# Patient Record
Sex: MALE | Race: WHITE | HISPANIC OR LATINO | Employment: UNEMPLOYED | URBAN - METROPOLITAN AREA
[De-identification: names, ages, dates, MRNs, and addresses within clinical notes are randomized per-mention and may not be internally consistent; named-entity substitution may affect disease eponyms.]

---

## 2020-08-14 ENCOUNTER — HOSPITAL ENCOUNTER (EMERGENCY)
Facility: HOSPITAL | Age: 35
Discharge: HOME | End: 2020-08-14
Attending: EMERGENCY MEDICINE

## 2020-08-14 VITALS
TEMPERATURE: 99 F | RESPIRATION RATE: 17 BRPM | SYSTOLIC BLOOD PRESSURE: 142 MMHG | OXYGEN SATURATION: 99 % | HEART RATE: 84 BPM | DIASTOLIC BLOOD PRESSURE: 82 MMHG

## 2020-08-14 DIAGNOSIS — M79.89 PAIN AND SWELLING OF LEFT LOWER LEG: ICD-10-CM

## 2020-08-14 DIAGNOSIS — I82.492 ACUTE DEEP VEIN THROMBOSIS (DVT) OF OTHER SPECIFIED VEIN OF LEFT LOWER EXTREMITY: Primary | ICD-10-CM

## 2020-08-14 DIAGNOSIS — M79.662 PAIN AND SWELLING OF LEFT LOWER LEG: ICD-10-CM

## 2020-08-14 LAB
ALBUMIN SERPL BCP-MCNC: 4.1 G/DL (ref 3.5–5.2)
ALP SERPL-CCNC: 96 U/L (ref 55–135)
ALT SERPL W/O P-5'-P-CCNC: 70 U/L (ref 10–44)
ANION GAP SERPL CALC-SCNC: 11 MMOL/L (ref 8–16)
APTT BLDCRRT: 23.6 SEC (ref 21–32)
AST SERPL-CCNC: 45 U/L (ref 10–40)
BASOPHILS # BLD AUTO: 0.06 K/UL (ref 0–0.2)
BASOPHILS NFR BLD: 0.7 % (ref 0–1.9)
BILIRUB SERPL-MCNC: 0.5 MG/DL (ref 0.1–1)
BUN SERPL-MCNC: 10 MG/DL (ref 6–20)
CALCIUM SERPL-MCNC: 9.7 MG/DL (ref 8.7–10.5)
CHLORIDE SERPL-SCNC: 106 MMOL/L (ref 95–110)
CO2 SERPL-SCNC: 23 MMOL/L (ref 23–29)
CREAT SERPL-MCNC: 0.8 MG/DL (ref 0.5–1.4)
DIFFERENTIAL METHOD: ABNORMAL
EOSINOPHIL # BLD AUTO: 0.1 K/UL (ref 0–0.5)
EOSINOPHIL NFR BLD: 1.5 % (ref 0–8)
ERYTHROCYTE [DISTWIDTH] IN BLOOD BY AUTOMATED COUNT: 11 % (ref 11.5–14.5)
EST. GFR  (AFRICAN AMERICAN): >60 ML/MIN/1.73 M^2
EST. GFR  (NON AFRICAN AMERICAN): >60 ML/MIN/1.73 M^2
GLUCOSE SERPL-MCNC: 103 MG/DL (ref 70–110)
HCT VFR BLD AUTO: 42.2 % (ref 40–54)
HGB BLD-MCNC: 14.3 G/DL (ref 14–18)
HIV 1+2 AB+HIV1 P24 AG SERPL QL IA: NEGATIVE
IMM GRANULOCYTES # BLD AUTO: 0.09 K/UL (ref 0–0.04)
IMM GRANULOCYTES NFR BLD AUTO: 1 % (ref 0–0.5)
INR PPP: 0.9 (ref 0.8–1.2)
LYMPHOCYTES # BLD AUTO: 2 K/UL (ref 1–4.8)
LYMPHOCYTES NFR BLD: 23.2 % (ref 18–48)
MCH RBC QN AUTO: 32.2 PG (ref 27–31)
MCHC RBC AUTO-ENTMCNC: 33.9 G/DL (ref 32–36)
MCV RBC AUTO: 95 FL (ref 82–98)
MONOCYTES # BLD AUTO: 0.9 K/UL (ref 0.3–1)
MONOCYTES NFR BLD: 10 % (ref 4–15)
NEUTROPHILS # BLD AUTO: 5.5 K/UL (ref 1.8–7.7)
NEUTROPHILS NFR BLD: 63.6 % (ref 38–73)
NRBC BLD-RTO: 0 /100 WBC
PLATELET # BLD AUTO: 324 K/UL (ref 150–350)
PMV BLD AUTO: 10.3 FL (ref 9.2–12.9)
POTASSIUM SERPL-SCNC: 4 MMOL/L (ref 3.5–5.1)
PROT SERPL-MCNC: 7.9 G/DL (ref 6–8.4)
PROTHROMBIN TIME: 10 SEC (ref 9–12.5)
RBC # BLD AUTO: 4.44 M/UL (ref 4.6–6.2)
SODIUM SERPL-SCNC: 140 MMOL/L (ref 136–145)
WBC # BLD AUTO: 8.67 K/UL (ref 3.9–12.7)

## 2020-08-14 PROCEDURE — 85730 THROMBOPLASTIN TIME PARTIAL: CPT

## 2020-08-14 PROCEDURE — 80053 COMPREHEN METABOLIC PANEL: CPT

## 2020-08-14 PROCEDURE — 36415 COLL VENOUS BLD VENIPUNCTURE: CPT

## 2020-08-14 PROCEDURE — 85610 PROTHROMBIN TIME: CPT

## 2020-08-14 PROCEDURE — 85025 COMPLETE CBC W/AUTO DIFF WBC: CPT

## 2020-08-14 PROCEDURE — 99284 EMERGENCY DEPT VISIT MOD MDM: CPT | Mod: 25

## 2020-08-14 PROCEDURE — 86703 HIV-1/HIV-2 1 RESULT ANTBDY: CPT

## 2020-08-14 RX ORDER — APIXABAN 5 MG (74)
KIT ORAL
Qty: 74 TABLET | Refills: 0 | OUTPATIENT
Start: 2020-08-14 | End: 2020-09-14

## 2020-08-14 NOTE — ED NOTES
C/o LLE swelling and pain for over 1 month. Swelling starts @ upper thigh and extends down into foot. AAOx4. Denies any other complaint. Sitting in bed appears in NAD. Pt is [place] speaking.

## 2020-08-14 NOTE — ED PROVIDER NOTES
8/14/2020, 5:40 PM   History obtained from the patient      History of Present Illness: Aman Denson is a 35 y.o. male patient who presents to the Emergency Department for L lower leg swelling which onset gradually 1 month ago.    5:42 PM: Pt was placed back in [facility]. I explained to pt that due to lack of available rooms pt was seen by myself to begin the workup. Pt understands they will be seen and dispositioned by the next available physician. Pt voices understanding and agrees with plan. Pt also understands the longer than normal wait time. I am removing myself from the care of pt and pt will now be assigned to the next available physician.     DARLEEN Chisholm FNP-C           HISTORY     Chief Complaint   Patient presents with    Leg Pain     LLE pain and swelling in calf with swellign down to ankles x 1 month. Numbness on ambulating.     Review of patient's allergies indicates:  No Known Allergies     HPI   The history is provided by the patient.   Leg Pain   There was no injury mechanism. The incident occurred several weeks ago. The pain is present in the left leg. The pain has been constant since onset. Pertinent negatives include no numbness, no inability to bear weight and no tingling.   L lower legswelling x 1 month. Pt reports being seen at urgent care for symptoms 2 weeks and sent home with pills. Pt states that he is unsure of facility or medication. Pt denies any CP, SOB, NV, abd pain or any other symptoms      PCP: Primary Doctor No     Past Medical History:  No past medical history on file.     Past Surgical History:  No past surgical history on file.     Family History:  No family history on file.     Social History:  Social History     Tobacco Use    Smoking status: Not on file   Substance and Sexual Activity    Alcohol use: Not on file    Drug use: Not on file    Sexual activity: Not on file         ROS   Review of Systems   Constitutional: Negative for fever.   HENT: Negative for sore  throat.    Respiratory: Negative for shortness of breath.    Cardiovascular: Negative for chest pain.   Gastrointestinal: Negative for nausea.   Genitourinary: Negative for dysuria.   Musculoskeletal: Negative for back pain.        + L lower leg swelling and pain   Skin: Negative for rash.   Neurological: Negative for tingling, weakness and numbness.   Hematological: Does not bruise/bleed easily.   All other systems reviewed and are negative.      PHYSICAL EXAM     Initial Vitals [08/14/20 1737]   BP Pulse Resp Temp SpO2   (!) 148/96 87 16 99.4 °F (37.4 °C) 96 %      MAP       --           Physical Exam    Constitutional: He appears well-developed and well-nourished. No distress.   HENT:   Head: Normocephalic and atraumatic.   Nose: Nose normal.   Mouth/Throat: Uvula is midline and oropharynx is clear and moist.   Eyes: Conjunctivae and EOM are normal. Pupils are equal, round, and reactive to light.   Neck: Normal range of motion. Neck supple.   Cardiovascular: Normal rate and regular rhythm.   Pulmonary/Chest: Effort normal and breath sounds normal. No respiratory distress. He has no decreased breath sounds. He has no wheezes. He has no rales.   Abdominal: Soft. Normal appearance and bowel sounds are normal. There is no abdominal tenderness.   Musculoskeletal: Normal range of motion.      Comments: LLE: no evident deformity. Positive for swelling and erythema. Positive for tenderness. ROM is normal. Cap refill distally is <2 seconds. DP and PT pulses are equal and 2+ bilaterally. No motor deficit. No distal sensory deficit   Neurological: He is alert and oriented to person, place, and time. He has normal strength. GCS eye subscore is 4. GCS verbal subscore is 5. GCS motor subscore is 6.   Skin: Skin is warm and dry. Capillary refill takes less than 2 seconds. No rash noted.   Psychiatric: He has a normal mood and affect. His speech is normal and behavior is normal.          ED COURSE   Procedures  ED ONGOING  VITALS:  Vitals:    08/14/20 1737 08/14/20 2010   BP: (!) 148/96 (!) 142/82   Pulse: 87 84   Resp: 16 17   Temp: 99.4 °F (37.4 °C)    SpO2: 96% 99%         ABNORMAL LAB VALUES:  Labs Reviewed   CBC W/ AUTO DIFFERENTIAL - Abnormal; Notable for the following components:       Result Value    RBC 4.44 (*)     Mean Corpuscular Hemoglobin 32.2 (*)     RDW 11.0 (*)     Immature Granulocytes 1.0 (*)     Immature Grans (Abs) 0.09 (*)     All other components within normal limits   COMPREHENSIVE METABOLIC PANEL - Abnormal; Notable for the following components:    AST 45 (*)     ALT 70 (*)     All other components within normal limits   HIV 1 / 2 ANTIBODY   PROTIME-INR   APTT         ALL LAB VALUES:  Results for orders placed or performed during the hospital encounter of 08/14/20   HIV 1/2 Ag/Ab (4th Gen)   Result Value Ref Range    HIV 1/2 Ag/Ab Negative Negative   CBC auto differential   Result Value Ref Range    WBC 8.67 3.90 - 12.70 K/uL    RBC 4.44 (L) 4.60 - 6.20 M/uL    Hemoglobin 14.3 14.0 - 18.0 g/dL    Hematocrit 42.2 40.0 - 54.0 %    Mean Corpuscular Volume 95 82 - 98 fL    Mean Corpuscular Hemoglobin 32.2 (H) 27.0 - 31.0 pg    Mean Corpuscular Hemoglobin Conc 33.9 32.0 - 36.0 g/dL    RDW 11.0 (L) 11.5 - 14.5 %    Platelets 324 150 - 350 K/uL    MPV 10.3 9.2 - 12.9 fL    Immature Granulocytes 1.0 (H) 0.0 - 0.5 %    Gran # (ANC) 5.5 1.8 - 7.7 K/uL    Immature Grans (Abs) 0.09 (H) 0.00 - 0.04 K/uL    Lymph # 2.0 1.0 - 4.8 K/uL    Mono # 0.9 0.3 - 1.0 K/uL    Eos # 0.1 0.0 - 0.5 K/uL    Baso # 0.06 0.00 - 0.20 K/uL    nRBC 0 0 /100 WBC    Gran% 63.6 38.0 - 73.0 %    Lymph% 23.2 18.0 - 48.0 %    Mono% 10.0 4.0 - 15.0 %    Eosinophil% 1.5 0.0 - 8.0 %    Basophil% 0.7 0.0 - 1.9 %    Differential Method Automated    Comprehensive metabolic panel   Result Value Ref Range    Sodium 140 136 - 145 mmol/L    Potassium 4.0 3.5 - 5.1 mmol/L    Chloride 106 95 - 110 mmol/L    CO2 23 23 - 29 mmol/L    Glucose 103 70 - 110 mg/dL     BUN, Bld 10 6 - 20 mg/dL    Creatinine 0.8 0.5 - 1.4 mg/dL    Calcium 9.7 8.7 - 10.5 mg/dL    Total Protein 7.9 6.0 - 8.4 g/dL    Albumin 4.1 3.5 - 5.2 g/dL    Total Bilirubin 0.5 0.1 - 1.0 mg/dL    Alkaline Phosphatase 96 55 - 135 U/L    AST 45 (H) 10 - 40 U/L    ALT 70 (H) 10 - 44 U/L    Anion Gap 11 8 - 16 mmol/L    eGFR if African American >60 >60 mL/min/1.73 m^2    eGFR if non African American >60 >60 mL/min/1.73 m^2   Protime-INR   Result Value Ref Range    Prothrombin Time 10.0 9.0 - 12.5 sec    INR 0.9 0.8 - 1.2   APTT   Result Value Ref Range    aPTT 23.6 21.0 - 32.0 sec           RADIOLOGY STUDIES:  Imaging Results          US Lower Extremity Veins Left (Final result)  Result time 08/14/20 19:42:18    Final result by Armando Freeman MD (08/14/20 19:42:18)                 Impression:      Acute occlusive left lower extremity DVT.      Electronically signed by: Armando Freeman MD  Date:    08/14/2020  Time:    19:42             Narrative:    EXAMINATION:  US LOWER EXTREMITY VEINS LEFT    CLINICAL HISTORY:  Pain in left lower leg    FINDINGS:  Grayscale and color Doppler sonography was formed through the  left  lower extremity    There is acute occlusive thrombosis of the left common femoral vein, femoral vein, greater saphenous vein, popliteal vein.  The ankle veins appear patent.                                          The above vital signs and test results have been reviewed by the emergency provider.     ED Medications:  There are no discharge medications for this patient.    Discharge Medications:  Discharge Medication List as of 8/14/2020  7:48 PM      START taking these medications    Details   apixaban (ELIQUIS DVT-PE TREAT 30D START) 5 mg (74 tabs) DsPk For the first 7 days take two 5 mg tablets twice daily.  After 7 days take one 5 mg tablet twice daily., Print            Follow-up Information     Schedule an appointment as soon as possible for a visit  with [facility].    Specialty:  Hematology and Oncology  Contact information:  [address] [facility] [address] [phone]  Additional information:  4th Floor - From [address], take the [facility] exit [address] Enter the facility from the [address]             Language line used for discharge teaching: Regarding DVT, I instructed patient to: take medications exactly as directed to thin the blood and help prevent new clots; keep all appointments for lab tests and with provider; avoid sitting, standing, or lying down for long periods without moving legs and feet; ambulate and move around when possible; if unable to get up, wiggle toes and tighten the calf muscles to keep blood moving; wear compression stockings as prescribed; and  elevate legs whenever they feel swollen or heavy.  I also discussed lifestyle changes that can be beneficial such as starting an exercise program, participate in walking or gardening activities, and maintain a healthy weight.  Also advised patient call emergency medical services or 911 if any of the following symptoms develop: shortness of breath; chest pain; coughing up blood; develop a bluish color and coldness in an arm or leg; any unusual or unexpected bleeding (i.e., bleeding from ears or nose); black, tarry bowel movements or blood in bowel movement;blood in urine or red or brown colored urine; blood in vomit or dark brown or black material in vomit that looks like coffee grounds; and/or any new bruises with no known cause or severe bruising.    I discussed with patient and/or family/caretaker that evaluation in the ED does not suggest any emergent or life threatening medical conditions requiring immediate intervention beyond what was provided in the ED, and I believe patient is safe for discharge. Regardless, an unremarkable evaluation in the ED does not preclude the development or presence of a serious or life threatening condition. As such, patient was instructed to return immediately  for any worsening or change in current symptoms.        MEDICAL DECISION MAKING                 CLINICAL IMPRESSION       ICD-10-CM ICD-9-CM   1. Acute deep vein thrombosis (DVT) of other specified vein of left lower extremity  I82.492 453.40   2. Pain and swelling of left lower leg  M79.662 729.5    M79.89 729.81                 Jack Chisholm Jr., [facility]  08/14/20 2053

## 2020-08-15 NOTE — ED NOTES
Pt in NAD. Verbalized understanding of discharge instructions. Pt ambulatory to ride for discharge.

## 2020-09-14 ENCOUNTER — HOSPITAL ENCOUNTER (EMERGENCY)
Facility: HOSPITAL | Age: 35
Discharge: HOME | End: 2020-09-14
Attending: EMERGENCY MEDICINE

## 2020-09-14 VITALS
DIASTOLIC BLOOD PRESSURE: 96 MMHG | SYSTOLIC BLOOD PRESSURE: 154 MMHG | TEMPERATURE: 99 F | HEART RATE: 88 BPM | HEIGHT: 69 IN | BODY MASS INDEX: 37.2 KG/M2 | RESPIRATION RATE: 18 BRPM | WEIGHT: 251.13 LBS | OXYGEN SATURATION: 98 %

## 2020-09-14 DIAGNOSIS — I82.4Y2 ACUTE DEEP VEIN THROMBOSIS (DVT) OF PROXIMAL VEIN OF LEFT LOWER EXTREMITY: ICD-10-CM

## 2020-09-14 DIAGNOSIS — M79.89 LEG SWELLING: Primary | ICD-10-CM

## 2020-09-14 PROCEDURE — 99283 EMERGENCY DEPT VISIT LOW MDM: CPT

## 2020-09-14 NOTE — ED PROVIDER NOTES
SCRIBE #1 NOTE: I, Jeffrey Arias, am scribing for, and in the presence of, Aron Low MD. I have scribed the entire note.      History      Chief Complaint   Patient presents with    Leg Swelling     L leg swelling x 2 months pt denies injury (language line used)       Review of patient's allergies indicates:  No Known Allergies     HPI   HPI    9/14/2020, 10:26 AM   History obtained from the patient, with interpretation services provided by Frederic      History of Present Illness: Aman Denson is a 35 y.o. male patient who presents to the Emergency Department for LLE swelling, onset 2 months PTA. Pt followed with his PCP for his sxs and was started on Eliquis; however, pt states that he ran out of the medication 1 week ago. Symptoms are constant and moderate in severity. No mitigating or exacerbating factors reported. No associated sxs reported. Patient denies any fever, chills, n/v/d, SOB, CP, weakness, numbness, dizziness, headache, and all other sxs at this time. No further complaints or concerns at this time.     Arrival mode: Personal vehicle    PCP: Primary Doctor No       Past Medical History:  No past medical history on file.    Past Surgical History:  No past surgical history on file.      Family History:  No family history on file.    Social History:  Social History     Tobacco Use    Smoking status: Not on file   Substance and Sexual Activity    Alcohol use: Not on file    Drug use: Not on file    Sexual activity: Not on file       ROS   Review of Systems   Constitutional: Negative for chills and fever.   HENT: Negative for sore throat.    Respiratory: Negative for shortness of breath.    Cardiovascular: Positive for leg swelling (LLE). Negative for chest pain.   Gastrointestinal: Negative for diarrhea, nausea and vomiting.   Genitourinary: Negative for dysuria.   Musculoskeletal: Negative for back pain.   Skin: Negative for rash.   Neurological: Negative for dizziness, seizures, weakness,  "light-headedness, numbness and headaches.   Hematological: Does not bruise/bleed easily.   All other systems reviewed and are negative.    Physical Exam      Initial Vitals [09/14/20 1012]   BP Pulse Resp Temp SpO2   (!) 154/96 88 18 99 °F (37.2 °C) 98 %      MAP       --          Physical Exam  Nursing Notes and Vital Signs Reviewed.  Constitutional: Patient is in no acute distress. Well-developed and well-nourished.  Head: Atraumatic. Normocephalic.  Eyes: PERRL. EOM intact. Conjunctivae are not pale. No scleral icterus.  ENT: Mucous membranes are moist. Oropharynx is clear and symmetric.    Neck: Supple. Full ROM. No lymphadenopathy.  Cardiovascular: Regular rate. Regular rhythm. No murmurs, rubs, or gallops. Distal pulses are 2+ and symmetric.  Pulmonary/Chest: No respiratory distress. Clear to auscultation bilaterally. No wheezing or rales.  Abdominal: Soft and non-distended.  There is no tenderness.  No rebound, guarding, or rigidity.   Musculoskeletal: Moves all extremities. No obvious deformities. Swelling to LLE. No erythema or warmth.  Skin: Warm and dry.  Neurological:  Alert, awake, and appropriate.  Normal speech.  No acute focal neurological deficits are appreciated.  Psychiatric: Normal affect. Good eye contact. Appropriate in content.    ED Course    Procedures  ED Vital Signs:  Vitals:    09/14/20 1011 09/14/20 1012   BP:  (!) 154/96   Pulse:  88   Resp:  18   Temp:  99 °F (37.2 °C)   TempSrc:  Oral   SpO2:  98%   Weight: 113.9 kg (251 lb 1.7 oz)    Height: 5' 9" (1.753 m)               The Emergency Provider reviewed the vital signs and test results, which are outlined above.    ED Discussion     10:28 AM: Discussed with pt all pertinent ED information. Discussed pt dx and plan of tx. Gave pt all f/u and return to the ED instructions. All questions and concerns were addressed at this time. Pt expresses understanding of information and instructions, and is comfortable with plan to discharge. Pt is " stable for discharge.    I discussed with patient and/or family/caretaker that evaluation in the ED does not suggest any emergent or life threatening medical conditions requiring immediate intervention beyond what was provided in the ED, and I believe patient is safe for discharge.  Regardless, an unremarkable evaluation in the ED does not preclude the development or presence of a serious of life threatening condition. As such, patient was instructed to return immediately for any worsening or change in current symptoms.         ED Medication(s):  Medications - No data to display    Follow-up Information     [facility] In 2 days.    Contact information:  [address] [facility] [address] [phone]                  Discharge Medication List as of 9/14/2020 10:29 AM      START taking these medications    Details   apixaban (ELIQUIS) 5 mg Tab Take 1 tablet (5 mg total) by mouth 2 (two) times daily., Starting Mon 9/14/2020, Print               Medical Decision Making              Scribe Attestation:   Scribe #1: I performed the above scribed service and the documentation accurately describes the services I performed. I attest to the accuracy of the note.    Attending:   Physician Attestation Statement for Scribe #1: I, Aron Low MD, personally performed the services described in this documentation, as scribed by Jeffrey Arias, in my presence, and it is both accurate and complete.          Clinical Impression       ICD-10-CM ICD-9-CM   1. Leg swelling  M79.89 729.81   2. Acute deep vein thrombosis (DVT) of proximal vein of left lower extremity  I82.4Y2 453.41       Disposition:   Disposition: Discharged  Condition: Stable         Aron Low MD  09/14/20 0370

## 2020-12-07 ENCOUNTER — HOSPITAL ENCOUNTER (EMERGENCY)
Facility: HOSPITAL | Age: 35
Discharge: HOME | End: 2020-12-07
Attending: STUDENT IN AN ORGANIZED HEALTH CARE EDUCATION/TRAINING PROGRAM

## 2020-12-07 VITALS
BODY MASS INDEX: 37.88 KG/M2 | DIASTOLIC BLOOD PRESSURE: 96 MMHG | SYSTOLIC BLOOD PRESSURE: 192 MMHG | HEART RATE: 106 BPM | WEIGHT: 256.5 LBS | TEMPERATURE: 99 F | OXYGEN SATURATION: 96 % | RESPIRATION RATE: 20 BRPM

## 2020-12-07 DIAGNOSIS — M79.89 LEFT LEG SWELLING: Primary | ICD-10-CM

## 2020-12-07 PROCEDURE — 99284 EMERGENCY DEPT VISIT MOD MDM: CPT | Mod: 25

## 2020-12-07 NOTE — ED PROVIDER NOTES
SCRIBE #1 NOTE: I, Samuel Hobbs, am scribing for, and in the presence of, Graham Giron MD. I have scribed the entire note.       History     Chief Complaint   Patient presents with    Leg Swelling     L leg swelling and redness to L leg x 1 month; hx of DVT in same leg in september this year     Review of patient's allergies indicates:  No Known Allergies      History of Present Illness     HPI    12/7/2020, 1:14 PM  History obtained from the patient.   used.      History of Present Illness: Aman Denson is a 35 y.o. male patient who presents to the Emergency Department for evaluation of LLE swelling which onset earlier this year. Pt was last seen here on 9/14/20 as a follow up when he ran out of [employer]. Pt is again requesting a refill of Eliquis. He denies pain, but reports that the swelling is worse when he exerts himself at work. Symptoms are intermittet and moderate in severity. No associated sxs reported. Patient denies any fever, chills, numbness, weakness, dizziness, CP, SOB, and all other sxs at this time. No further complaints or concerns at this time.       Arrival mode: Personal vehicle    PCP: Primary Doctor No     Past Medical History:  History reviewed. No pertinent medical history.    Past Surgical History:  History reviewed. No pertinent surgical history.    Family History:  History reviewed. No pertinent family history.    Social History:  Social History Main Topics    Smoking status: Unknown if ever smoked    Smokeless tobacco: Unknown if ever used    Alcohol Use: Unknown drinking history    Drug Use: Unknown if ever used    Sexual Activity: Unknown        Review of Systems     Review of Systems   Constitutional: Negative for activity change, chills, diaphoresis and fever.   HENT: Negative for congestion, drooling, ear pain, rhinorrhea, sneezing, sore throat and trouble swallowing.    Eyes: Negative for pain.   Respiratory: Negative for cough, chest tightness, shortness of  breath, wheezing and stridor.    Cardiovascular: Positive for leg swelling (LLE). Negative for chest pain and palpitations.   Gastrointestinal: Negative for abdominal distention, abdominal pain, constipation, diarrhea, nausea and vomiting.   Genitourinary: Negative for difficulty urinating, dysuria, frequency and urgency.   Musculoskeletal: Negative for arthralgias, back pain, myalgias, neck pain and neck stiffness.   Skin: Negative for pallor, rash and wound.   Neurological: Negative for dizziness, syncope, weakness, light-headedness, numbness and headaches.   All other systems reviewed and are negative.       Physical Exam     Initial Vitals [12/07/20 1154]   BP Pulse Resp Temp SpO2   (!) 192/96 106 20 98.9 °F (37.2 °C) 96 %      MAP       --          Physical Exam  Nursing Notes and Vital Signs Reviewed.  Constitutional: Patient is in no acute distress.  Head: Atraumatic. Normocephalic.  Eyes: PERRL. EOM intact. Conjunctivae are not pale. No scleral icterus.  ENT: Mucous membranes are moist. Oropharynx is clear and symmetric.    Neck: Supple. Full ROM. No lymphadenopathy.  Cardiovascular: Regular rate. Regular rhythm. No murmurs, rubs, or gallops. Distal pulses are 2+ and symmetric.  Pulmonary/Chest: No respiratory distress. Clear to auscultation bilaterally. No wheezing or rales.  Abdominal: Soft and non-distended.  There is no tenderness.  No rebound, guarding, or rigidity. Good bowel sounds.  Genitourinary: No CVA tenderness  Musculoskeletal: Moves all extremities. No obvious deformities. No calf tenderness.  LLE: no evident deformity. Significant Circumferential nonpitting edema. Negative for tenderness. ROM is normal. Cap refill distally is <2 seconds. DP and PT pulses are equal and 2+ bilaterally. No motor deficit. No distal sensory deficit  Skin: Warm and dry.  Neurological:  Alert, awake, and appropriate.  Normal speech.  No acute focal neurological deficits are appreciated.  Psychiatric: Normal affect.  Good eye contact. Appropriate in content.     ED Course   Procedures  ED Vital Signs:  Vitals:    12/07/20 1154   BP: (!) 192/96   Pulse: 106   Resp: 20   Temp: 98.9 °F (37.2 °C)   TempSrc: Oral   SpO2: 96%   Weight: 116.3 kg (256 lb 8.1 oz)       Abnormal Lab Results:  Labs Reviewed - No data to display     All Lab Results:  None    Imaging Results:  Imaging Results          US Lower Extremity Veins Left (Final result)  Result time 12/07/20 13:21:27    Final result by Neil Gonzales MD (12/07/20 13:21:27)                 Impression:      No evidence of deep venous thrombosis in the left lower extremity.      Electronically signed by: Neil Gonzales MD  Date:    12/07/2020  Time:    13:21             Narrative:    EXAMINATION:  US LOWER EXTREMITY VEINS LEFT    CLINICAL HISTORY:  Other specified soft tissue disorders    TECHNIQUE:  Duplex and color flow Doppler evaluation and graded compression of the left lower extremity veins was performed.    COMPARISON:  None    FINDINGS:  Left thigh veins: The common femoral, femoral, popliteal, upper greater saphenous, and deep femoral veins are patent and free of thrombus. The veins are normally compressible and have normal phasic flow and augmentation response.    Left calf veins: The visualized calf veins are patent.    Contralateral CFV: The contralateral (right) common femoral vein is patent and free of thrombus.    Miscellaneous: None                                        The Emergency Provider reviewed the vital signs and test results, which are outlined above.     ED Discussion       1:51 PM: Reassessed pt at this time.  Pt states his condition has improved at this time. Discussed with pt all pertinent ED information and results. Discussed pt dx and plan of tx. Gave pt all f/u and return to the ED instructions. All questions and concerns were addressed at this time. Pt expresses understanding of information and instructions, and is comfortable with plan to discharge.  Pt is stable for discharge.    I discussed with patient and/or family/caretaker that evaluation in the ED does not suggest any emergent or life threatening medical conditions requiring immediate intervention beyond what was provided in the ED, and I believe patient is safe for discharge.  Regardless, an unremarkable evaluation in the ED does not preclude the development or presence of a serious of life threatening condition. As such, patient was instructed to return immediately for any worsening or change in current symptoms.         Medical Decision Making:   Clinical Tests:   Radiological Study: Ordered and Reviewed  ED Management:  35-year-old male with history of DVT, has been on Eliquis.  He came in today for reevaluation due to continued swelling in the left lower extremity.  No DVTs on ultrasound today.  Patient did have mild tenderness to palpation throughout the entirety of the left leg but no outright pain.  He says this leg is been consistently swollen throughout the past few months.  Gets mildly worse when he is on it for work.  Says he mainly came into get a new prescription for Eliquis.  Patient does not have a primary care provider.  Patient was given resources to find a primary care provider.  I did give patient a prescription for Eliquis and he was also told to return to the emergency department if symptoms worsen or change in character.           ED Medication(s):  Medications - No data to display    Discharge Medication List as of 12/7/2020  1:41 PM          Follow-up Information     Primary Care Provider of your choice. Schedule an appointment as soon as possible for a visit in 5 days.    Why: For follow-up on today's visit.           Go to  [facility] - .    Specialty: Emergency Medicine  Why: As needed, If symptoms worsen  Contact information:  [id number] [facility] [id number] [phone]                     Scribe Attestation:   Scribe #1: I  performed the above scribed service and the documentation accurately describes the services I performed. I attest to the accuracy of the note.     Attending:   Physician Attestation Statement for Scribe #1: I, Graham Giron MD, personally performed the services described in this documentation, as scribed by Samuel Hobbs, in my presence, and it is both accurate and complete.           Clinical Impression       ICD-10-CM ICD-9-CM   1. Left leg swelling  M79.89 729.81       Disposition:   Disposition: Discharged  Condition: Stable         Graham Giron MD  12/09/20 1927

## 2020-12-07 NOTE — Clinical Note
"Aman"John Denson was seen and treated in our emergency department on 12/7/2020.  He may return to work on 12/08/2020.       If you have any questions or concerns, please don't hesitate to call.      Graham Giron MD" Wound care consult

## 2021-03-16 ENCOUNTER — HOSPITAL ENCOUNTER (EMERGENCY)
Facility: HOSPITAL | Age: 36
Discharge: HOME | End: 2021-03-16
Attending: EMERGENCY MEDICINE

## 2021-03-16 VITALS
RESPIRATION RATE: 18 BRPM | TEMPERATURE: 99 F | SYSTOLIC BLOOD PRESSURE: 183 MMHG | OXYGEN SATURATION: 96 % | DIASTOLIC BLOOD PRESSURE: 105 MMHG | HEART RATE: 94 BPM

## 2021-03-16 DIAGNOSIS — M79.89 LEFT LEG SWELLING: ICD-10-CM

## 2021-03-16 PROCEDURE — 99284 EMERGENCY DEPT VISIT MOD MDM: CPT | Mod: 25

## 2021-05-10 ENCOUNTER — HOSPITAL ENCOUNTER (EMERGENCY)
Facility: HOSPITAL | Age: 36
Discharge: HOME | End: 2021-05-10
Attending: EMERGENCY MEDICINE

## 2021-05-10 VITALS
HEART RATE: 91 BPM | HEIGHT: 70 IN | BODY MASS INDEX: 38.63 KG/M2 | SYSTOLIC BLOOD PRESSURE: 159 MMHG | TEMPERATURE: 98 F | WEIGHT: 269.81 LBS | OXYGEN SATURATION: 97 % | RESPIRATION RATE: 18 BRPM | DIASTOLIC BLOOD PRESSURE: 92 MMHG

## 2021-05-10 DIAGNOSIS — H61.22 IMPACTED CERUMEN OF LEFT EAR: Primary | ICD-10-CM

## 2021-05-10 PROCEDURE — 25000003 PHARM REV CODE 250: Performed by: REGISTERED NURSE

## 2021-05-10 PROCEDURE — 99282 EMERGENCY DEPT VISIT SF MDM: CPT

## 2021-05-10 RX ADMIN — CARBAMIDE PEROXIDE 6.5% 5 DROP: 6.5 LIQUID AURICULAR (OTIC) at 12:05
